# Patient Record
Sex: FEMALE | Race: WHITE | NOT HISPANIC OR LATINO | Employment: FULL TIME | URBAN - METROPOLITAN AREA
[De-identification: names, ages, dates, MRNs, and addresses within clinical notes are randomized per-mention and may not be internally consistent; named-entity substitution may affect disease eponyms.]

---

## 2017-02-23 ENCOUNTER — TELEPHONE (OUTPATIENT)
Dept: FAMILY MEDICINE CLINIC | Facility: CLINIC | Age: 62
End: 2017-02-23

## 2017-02-23 RX ORDER — ZOLPIDEM TARTRATE 10 MG/1
10 TABLET ORAL NIGHTLY
Qty: 30 TABLET | Refills: 2 | Status: SHIPPED | OUTPATIENT
Start: 2017-02-23 | End: 2017-04-28 | Stop reason: SDUPTHER

## 2017-02-23 NOTE — TELEPHONE ENCOUNTER
rx called in  On the written rx it was only for one refill the yamile states 2  I only called in 1 refill

## 2017-03-07 ENCOUNTER — TELEPHONE (OUTPATIENT)
Dept: FAMILY MEDICINE CLINIC | Facility: CLINIC | Age: 62
End: 2017-03-07

## 2017-03-07 NOTE — TELEPHONE ENCOUNTER
Pt called stating she is having problems with her hemorrhoids ans asked if she should see you or someone else

## 2017-04-19 RX ORDER — CITALOPRAM 40 MG/1
TABLET ORAL
Qty: 90 TABLET | Refills: 0 | Status: SHIPPED | OUTPATIENT
Start: 2017-04-19 | End: 2017-06-05 | Stop reason: SDUPTHER

## 2017-04-28 RX ORDER — ZOLPIDEM TARTRATE 10 MG/1
TABLET ORAL
Qty: 30 TABLET | Refills: 0 | Status: SHIPPED | OUTPATIENT
Start: 2017-04-28 | End: 2017-05-30 | Stop reason: SDUPTHER

## 2017-05-31 RX ORDER — ZOLPIDEM TARTRATE 10 MG/1
TABLET ORAL
Qty: 15 TABLET | Refills: 0 | Status: SHIPPED | OUTPATIENT
Start: 2017-05-31 | End: 2017-06-05 | Stop reason: SDUPTHER

## 2017-06-05 ENCOUNTER — OFFICE VISIT (OUTPATIENT)
Dept: FAMILY MEDICINE CLINIC | Facility: CLINIC | Age: 62
End: 2017-06-05

## 2017-06-05 VITALS
OXYGEN SATURATION: 97 % | DIASTOLIC BLOOD PRESSURE: 62 MMHG | SYSTOLIC BLOOD PRESSURE: 120 MMHG | TEMPERATURE: 97.9 F | WEIGHT: 129 LBS | BODY MASS INDEX: 21.49 KG/M2 | HEART RATE: 48 BPM | HEIGHT: 65 IN

## 2017-06-05 DIAGNOSIS — E78.2 MIXED HYPERLIPIDEMIA: Primary | ICD-10-CM

## 2017-06-05 DIAGNOSIS — F51.01 PRIMARY INSOMNIA: ICD-10-CM

## 2017-06-05 DIAGNOSIS — I10 ESSENTIAL HYPERTENSION: ICD-10-CM

## 2017-06-05 LAB
ALBUMIN SERPL-MCNC: 4.5 G/DL (ref 3.5–5.2)
ALBUMIN/GLOB SERPL: 1.6 G/DL
ALP SERPL-CCNC: 50 U/L (ref 39–117)
ALT SERPL-CCNC: 11 U/L (ref 1–33)
AST SERPL-CCNC: 20 U/L (ref 1–32)
BILIRUB SERPL-MCNC: 0.3 MG/DL (ref 0.1–1.2)
BUN SERPL-MCNC: 30 MG/DL (ref 8–23)
BUN/CREAT SERPL: 25.9 (ref 7–25)
CALCIUM SERPL-MCNC: 10.1 MG/DL (ref 8.6–10.5)
CHLORIDE SERPL-SCNC: 102 MMOL/L (ref 98–107)
CHOLEST SERPL-MCNC: 233 MG/DL (ref 0–200)
CO2 SERPL-SCNC: 28.7 MMOL/L (ref 22–29)
CREAT SERPL-MCNC: 1.16 MG/DL (ref 0.57–1)
GLOBULIN SER CALC-MCNC: 2.8 GM/DL
GLUCOSE SERPL-MCNC: 99 MG/DL (ref 65–99)
HDLC SERPL-MCNC: 77 MG/DL (ref 40–60)
LDLC SERPL CALC-MCNC: 141 MG/DL (ref 0–100)
LDLC/HDLC SERPL: 1.83 {RATIO}
POTASSIUM SERPL-SCNC: 4.6 MMOL/L (ref 3.5–5.2)
PROT SERPL-MCNC: 7.3 G/DL (ref 6–8.5)
SODIUM SERPL-SCNC: 141 MMOL/L (ref 136–145)
TRIGL SERPL-MCNC: 77 MG/DL (ref 0–150)
TSH SERPL DL<=0.005 MIU/L-ACNC: 4 MIU/ML (ref 0.27–4.2)
VLDLC SERPL CALC-MCNC: 15.4 MG/DL (ref 5–40)

## 2017-06-05 PROCEDURE — 99213 OFFICE O/P EST LOW 20 MIN: CPT | Performed by: FAMILY MEDICINE

## 2017-06-05 RX ORDER — ZOLPIDEM TARTRATE 10 MG/1
10 TABLET ORAL NIGHTLY PRN
Qty: 30 TABLET | Refills: 5 | Status: SHIPPED | OUTPATIENT
Start: 2017-06-05 | End: 2017-12-15 | Stop reason: SDUPTHER

## 2017-06-05 RX ORDER — LISINOPRIL 10 MG/1
10 TABLET ORAL DAILY
Qty: 90 TABLET | Refills: 1 | Status: SHIPPED | OUTPATIENT
Start: 2017-06-05 | End: 2018-05-14 | Stop reason: SDUPTHER

## 2017-06-05 RX ORDER — CITALOPRAM 40 MG/1
40 TABLET ORAL DAILY
Qty: 90 TABLET | Refills: 1 | Status: SHIPPED | OUTPATIENT
Start: 2017-06-05

## 2017-06-05 NOTE — PROGRESS NOTES
Subjective   Hanna [place] is a 62 y.o. female presenting with   Chief Complaint   Patient presents with   • Hypertension     check up    • Hyperlipidemia     check up    • Med Refill     zolpidem  and  lovastatin    • Labs Only     drug screen         HPI Comments: 62-year-old nonsmoker here for follow-up for high blood pressure and high cholesterol and insomnia and anxiety.  She tells me she is doing very well without any side effects from her medication.  She tells me she will be due for a repeat colonoscopy later this year.  She has a gynecologist and gets regular mammograms.    She works out 4 days a week and that explains her bradycardia.  She says she feels great and has not had any problems so far.    Hypertension     Hyperlipidemia          The following portions of the patient's history were reviewed and updated as appropriate: current medications, past family history, past medical history, past social history, past surgical history and problem list.    Review of Systems   All other systems reviewed and are negative.      Objective   Physical Exam   Constitutional: She is oriented to person, place, and time. She appears well-developed and well-nourished. No distress.   HENT:   Head: Normocephalic and atraumatic.   Eyes: EOM are normal. Pupils are equal, round, and reactive to light.   Neck: Normal range of motion. Neck supple.   Cardiovascular: Regular rhythm, normal heart sounds and intact distal pulses.  Bradycardia present.    No murmur heard.  Pulmonary/Chest: Effort normal and breath sounds normal. No respiratory distress. She has no wheezes.   Musculoskeletal: Normal range of motion. She exhibits no edema or tenderness.   Neurological: She is alert and oriented to person, place, and time.   Skin: Skin is warm and dry. She is not diaphoretic.   Psychiatric: She has a normal mood and affect. Her behavior is normal.   Nursing note and vitals reviewed.      Assessment/Plan   Hanna was seen  today for hypertension, hyperlipidemia, med refill and labs only.    Diagnoses and all orders for this visit:    Mixed hyperlipidemia  -     Comprehensive Metabolic Panel  -     Lipid Panel With LDL / HDL Ratio    Essential hypertension  -     Comprehensive Metabolic Panel  -     TSH    Primary insomnia  -     [id number] 9+Oxycodone+Crt-Unbund    Other orders  -     zolpidem (AMBIEN) 10 MG tablet; Take 1 tablet by mouth At Night As Needed for Sleep.  -     lisinopril (PRINIVIL,ZESTRIL) 10 MG tablet; Take 1 tablet by mouth Daily.  -     citalopram (CeleXA) 40 MG tablet; Take 1 tablet by mouth Daily.                   I would like him to return for another visit in 6 month(s)

## 2017-06-05 NOTE — PATIENT INSTRUCTIONS
This is a very nice 62-year-old who is here for follow-up.  I will renew her medications and call her when her blood test results are available.

## 2017-06-06 NOTE — PROGRESS NOTES
Please call the patient regarding her abnormal result.  lmovm informing pt of her results asked her to call if any questions

## 2017-06-07 RX ORDER — LOVASTATIN 20 MG/1
TABLET ORAL
Qty: 90 TABLET | Refills: 3 | Status: SHIPPED | OUTPATIENT
Start: 2017-06-07 | End: 2017-10-27 | Stop reason: SDUPTHER

## 2017-10-06 ENCOUNTER — OFFICE VISIT (OUTPATIENT)
Dept: SURGERY | Facility: CLINIC | Age: 62
End: 2017-10-06

## 2017-10-06 VITALS
SYSTOLIC BLOOD PRESSURE: 124 MMHG | TEMPERATURE: 98.3 F | BODY MASS INDEX: 21.16 KG/M2 | HEART RATE: 56 BPM | OXYGEN SATURATION: 99 % | DIASTOLIC BLOOD PRESSURE: 80 MMHG | HEIGHT: 65 IN | WEIGHT: 127 LBS

## 2017-10-06 DIAGNOSIS — K64.8 INTERNAL HEMORRHOIDS WITH COMPLICATION: Primary | ICD-10-CM

## 2017-10-06 DIAGNOSIS — K62.5 RECTAL BLEEDING: ICD-10-CM

## 2017-10-06 DIAGNOSIS — K64.4 ANAL SKIN TAG: ICD-10-CM

## 2017-10-06 PROCEDURE — 46600 DIAGNOSTIC ANOSCOPY SPX: CPT | Performed by: COLON & RECTAL SURGERY

## 2017-10-06 PROCEDURE — 99244 OFF/OP CNSLTJ NEW/EST MOD 40: CPT | Performed by: COLON & RECTAL SURGERY

## 2017-10-06 NOTE — PROGRESS NOTES
Hanna Valle is a 62 y.o. female who is seen as a consult at the request of Carlitos De La Vega MD for anal swelling and bleeding     HPI:    Pt c/o hemorrhoids bothering her    Bother her almost every day    Occasional bleeding about once per week    Has daily BM    C/o anal swelling after BMs    Wipes vigorously after BMs    Stools mushy    No fiber or stool softeners    Has tried PrepH cream  Has used x2-3 week for 6 months    Most recent colonoscopy 2008: normal per patient    FamHx: no known hx colon polyps or colon cancer    Past Medical History:   Diagnosis Date   • Allergic rhinitis    • Fracture of ankle     left   • Hyperlipidemia    • Hypertension    • Insomnia        Past Surgical History:   Procedure Laterality Date   • ANKLE SURGERY Left    • COLONOSCOPY  2008   • TONSILLECTOMY AND ADENOIDECTOMY         Social History:   reports that she has never smoked. She does not have any smokeless tobacco history on file. She reports that she drinks alcohol.      Marriage status:     Family History   Problem Relation Age of Onset   • Hypertension Mother      benign essential   • Stroke Mother    • Hypertension Father      benign essential   • Heart disease Father          Current Outpatient Prescriptions:   •  Cetirizine HCl (ZYRTEC ALLERGY) 10 MG capsule, Take by mouth., Disp: , Rfl:   •  citalopram (CeleXA) 40 MG tablet, Take 1 tablet by mouth Daily., Disp: 90 tablet, Rfl: 1  •  lisinopril (PRINIVIL,ZESTRIL) 10 MG tablet, Take 1 tablet by mouth Daily., Disp: 90 tablet, Rfl: 1  •  lovastatin (MEVACOR) 20 MG tablet, TAKE ONE TABLET BY MOUTH DAILY, Disp: 90 tablet, Rfl: 3  •  zolpidem (AMBIEN) 10 MG tablet, Take 1 tablet by mouth At Night As Needed for Sleep., Disp: 30 tablet, Rfl: 5    Allergy  Review of patient's allergies indicates no known allergies.    Review of Systems   Endocrine: Positive for cold intolerance.   Musculoskeletal: Positive for arthritis and joint pain. Negative for back pain and  joint swelling.   Neurological: Positive for excessive daytime sleepiness. Negative for brief paralysis, dizziness, focal weakness and light-headedness.   All other systems reviewed and are negative.      Vitals:    10/06/17 1350   BP: 124/80   Pulse: 56   Temp: 98.3 °F (36.8 °C)   SpO2: 99%     Body mass index is 20.97 kg/(m^2).    Physical Exam   Constitutional: She is oriented to person, place, and time. She appears well-developed and well-nourished. No distress.   HENT:   Head: Normocephalic and atraumatic.   Nose: Nose normal.   Mouth/Throat: Oropharynx is clear and moist.   Eyes: Conjunctivae and EOM are normal. Pupils are equal, round, and reactive to light.   Neck: Normal range of motion. No tracheal deviation present.   Pulmonary/Chest: Effort normal and breath sounds normal. No respiratory distress.   Abdominal: Soft. Bowel sounds are normal. She exhibits no distension.   Genitourinary:   Genitourinary Comments: Perianal exam: external hem - enlarged tags right anterior and right posterior  JULIA- good tone, no masses  Anoscopy performed:  Grade 2 x 3 internal hem: irritated   Musculoskeletal: Normal range of motion. She exhibits no edema or deformity.   Neurological: She is alert and oriented to person, place, and time. No cranial nerve deficit. Coordination and gait normal.   Skin: Skin is warm and dry.   Psychiatric: She has a normal mood and affect. Her behavior is normal. Judgment normal.       Review of Medical Record:  I reviewed Dr. De La Vega telephone note: pt c/o hemorrhoids    Assessment:  1. Internal hemorrhoids with complication    2. Rectal bleeding    3. Anal skin tag        Plan:    For the rectal bleeding, I recommend colonoscopy to rule out major sources of bleeding other than hemorrhoids. At the time of colonoscopy if there are no serious issues found at that time, I recommend doing rubber band ligation of the enlarged internal hemorrhoids.  I described risk, benefits and alternatives to the  patient.  I described to patient typical post procedure recovery, typical outcomes, and 85% success rate. RBL will not take care of the tags.  The patient wishes to proceed.    In the meantime, I recommended for patient to treat conservatively with MiraLAX and fiber.  I recommend fiber therapy and detailed and gave written instructions on how to achieve a high fiber diet.    I recommend patient discontinue PrepH cream, as long-term use can cause thinning of the tissue.      Scribed for Ruperto Banegas MD by Tiff Richmond PA-C 10/6/2017  This patient was evaluated by me, recommendations made, documentation reviewed, edited, and revised by me, Ruperto Banegas MD

## 2017-10-27 ENCOUNTER — TELEPHONE (OUTPATIENT)
Dept: FAMILY MEDICINE CLINIC | Facility: CLINIC | Age: 62
End: 2017-10-27

## 2017-10-27 RX ORDER — LOVASTATIN 40 MG/1
40 TABLET ORAL NIGHTLY
Qty: 90 TABLET | Refills: 1 | Status: SHIPPED | OUTPATIENT
Start: 2017-10-27 | End: 2018-04-22 | Stop reason: SDUPTHER

## 2017-10-27 NOTE — TELEPHONE ENCOUNTER
I notified pt of labs and she is ok with increasing the lovastatin. You can send it in to [facility] in [place] thanks

## 2017-11-02 ENCOUNTER — OFFICE VISIT (OUTPATIENT)
Dept: SPORTS MEDICINE | Facility: CLINIC | Age: 62
End: 2017-11-02

## 2017-11-02 VITALS
HEIGHT: 65 IN | BODY MASS INDEX: 21.49 KG/M2 | OXYGEN SATURATION: 99 % | HEART RATE: 56 BPM | DIASTOLIC BLOOD PRESSURE: 86 MMHG | RESPIRATION RATE: 16 BRPM | SYSTOLIC BLOOD PRESSURE: 140 MMHG | WEIGHT: 129 LBS

## 2017-11-02 DIAGNOSIS — M25.572 CHRONIC PAIN OF LEFT ANKLE: ICD-10-CM

## 2017-11-02 DIAGNOSIS — G89.29 CHRONIC PAIN OF LEFT ANKLE: ICD-10-CM

## 2017-11-02 DIAGNOSIS — M25.612 DECREASED RANGE OF MOTION OF LEFT SHOULDER: ICD-10-CM

## 2017-11-02 DIAGNOSIS — M19.172 POST-TRAUMATIC ARTHRITIS OF LEFT ANKLE: ICD-10-CM

## 2017-11-02 DIAGNOSIS — M25.511 ACUTE PAIN OF RIGHT SHOULDER: Primary | ICD-10-CM

## 2017-11-02 PROCEDURE — 99214 OFFICE O/P EST MOD 30 MIN: CPT | Performed by: FAMILY MEDICINE

## 2017-11-02 RX ORDER — PREDNISONE 50 MG/1
50 TABLET ORAL DAILY
Qty: 7 TABLET | Refills: 0 | Status: SHIPPED | OUTPATIENT
Start: 2017-11-02 | End: 2017-11-09
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
## 2017-11-10 ENCOUNTER — TREATMENT (OUTPATIENT)
Dept: PHYSICAL THERAPY | Facility: CLINIC | Age: 62
End: 2017-11-10

## 2017-11-10 DIAGNOSIS — M25.512 ACUTE PAIN OF LEFT SHOULDER: Primary | ICD-10-CM

## 2017-11-10 DIAGNOSIS — M75.02 ADHESIVE CAPSULITIS OF LEFT SHOULDER: ICD-10-CM

## 2017-11-10 PROCEDURE — 97140 MANUAL THERAPY 1/> REGIONS: CPT | Performed by: PHYSICAL THERAPIST

## 2017-11-10 PROCEDURE — 97110 THERAPEUTIC EXERCISES: CPT | Performed by: PHYSICAL THERAPIST

## 2017-11-10 PROCEDURE — 97161 PT EVAL LOW COMPLEX 20 MIN: CPT | Performed by: PHYSICAL THERAPIST
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
## 2017-11-13 ENCOUNTER — TREATMENT (OUTPATIENT)
Dept: PHYSICAL THERAPY | Facility: CLINIC | Age: 62
End: 2017-11-13

## 2017-11-13 DIAGNOSIS — M75.02 ADHESIVE CAPSULITIS OF LEFT SHOULDER: ICD-10-CM

## 2017-11-13 DIAGNOSIS — M25.512 ACUTE PAIN OF LEFT SHOULDER: Primary | ICD-10-CM

## 2017-11-13 PROCEDURE — 97110 THERAPEUTIC EXERCISES: CPT | Performed by: PHYSICAL THERAPIST

## 2017-11-13 PROCEDURE — 97140 MANUAL THERAPY 1/> REGIONS: CPT | Performed by: PHYSICAL THERAPIST

## 2017-11-13 NOTE — PROGRESS NOTES
Physical Therapy Daily Progress Note        Patient: Hanna Valle   : 1955  Diagnosis/ICD-10 Code:  Acute pain of left shoulder [M25.512]  Referring practitioner: Ramone Hedrick *  Date of Initial Visit: Type: THERAPY  Noted: 11/10/2017  Today's Date: 2017  Patient seen for 2 sessions         Hanna Valle reports:     Subjective   Patient reports she feels her shoulder had improved, still has pain and tightness however notes that she can move it a little better.  Objective   See Exercise, Manual, and Modality Logs for complete treatment.       Assessment/Plan  Patient tolerated treatment well with increased pain free ROM following manual interventions.  Progress per Plan of Care           Manual Therapy:    8     mins  04532;  Therapeutic Exercise:    15     mins  39788;     Neuromuscular Eliza:        mins  02682;    Therapeutic Activity:          mins  58160;     Gait Training:           mins  56280;     Ultrasound:          mins  86456;    Electrical Stimulation:         mins  59287 (MC );  Dry Needling          mins self-pay    Timed Treatment:   23   mins   Total Treatment:     45   mins    Britt Crow, PT  Physical Therapist

## 2017-11-14 ENCOUNTER — APPOINTMENT (OUTPATIENT)
Dept: WOMENS IMAGING | Facility: HOSPITAL | Age: 62
End: 2017-11-14

## 2017-11-14 PROCEDURE — 77067 SCR MAMMO BI INCL CAD: CPT | Performed by: RADIOLOGY

## 2017-11-17 ENCOUNTER — TREATMENT (OUTPATIENT)
Dept: PHYSICAL THERAPY | Facility: CLINIC | Age: 62
End: 2017-11-17

## 2017-11-17 DIAGNOSIS — M25.512 ACUTE PAIN OF LEFT SHOULDER: Primary | ICD-10-CM

## 2017-11-17 DIAGNOSIS — M75.02 ADHESIVE CAPSULITIS OF LEFT SHOULDER: ICD-10-CM

## 2017-11-17 PROCEDURE — 97140 MANUAL THERAPY 1/> REGIONS: CPT | Performed by: PHYSICAL THERAPIST

## 2017-11-17 PROCEDURE — 97110 THERAPEUTIC EXERCISES: CPT | Performed by: PHYSICAL THERAPIST

## 2017-11-17 NOTE — PROGRESS NOTES
Physical Therapy Daily Progress Note        Patient: Hanna Valle   : 1955  Diagnosis/ICD-10 Code:  Acute pain of left shoulder [M25.512]  Referring practitioner: Ramone Hedrick *  Date of Initial Visit: Type: THERAPY  Noted: 11/10/2017  Today's Date: 2017  Patient seen for 3 sessions         Hanna Valle reports:     Subjective   Patient reports she is having less pain in her shoulder and has been able to use her arm in motions that she had previously not been able to do.    Objective   See Exercise, Manual, and Modality Logs for complete treatment.       Assessment/Plan  Patient tolerated treatment well without complaints.  Improving ROM and GHJ mobility.  Continues with inferior and posterior glide restriction.  Progress per Plan of Care and Progress strengthening /stabilization /functional activity           Manual Therapy:    10     mins  26983;  Therapeutic Exercise:    15     mins  88241;     Neuromuscular Eliza:        mins  40592;    Therapeutic Activity:          mins  23868;     Gait Training:           mins  02725;     Ultrasound:          mins  96320;    Electrical Stimulation:         mins  77509 ( );  Dry Needling          mins self-pay    Timed Treatment:   25   mins   Total Treatment:     35   mins    Britt Crow, PT  Physical Therapist

## 2017-11-18 ENCOUNTER — HOSPITAL ENCOUNTER (OUTPATIENT)
Facility: HOSPITAL | Age: 62
Setting detail: HOSPITAL OUTPATIENT SURGERY
Discharge: HOME | End: 2017-11-18
Attending: COLON & RECTAL SURGERY | Admitting: COLON & RECTAL SURGERY

## 2017-11-18 ENCOUNTER — ANESTHESIA (OUTPATIENT)
Dept: GASTROENTEROLOGY | Facility: HOSPITAL | Age: 62
End: 2017-11-18

## 2017-11-18 ENCOUNTER — ANESTHESIA EVENT (OUTPATIENT)
Dept: GASTROENTEROLOGY | Facility: HOSPITAL | Age: 62
End: 2017-11-18

## 2017-11-18 VITALS
HEART RATE: 60 BPM | SYSTOLIC BLOOD PRESSURE: 130 MMHG | OXYGEN SATURATION: 98 % | TEMPERATURE: 98.2 F | WEIGHT: 123 LBS | RESPIRATION RATE: 16 BRPM | DIASTOLIC BLOOD PRESSURE: 84 MMHG | HEIGHT: 65 IN | BODY MASS INDEX: 20.49 KG/M2

## 2017-11-18 DIAGNOSIS — K62.5 RECTAL BLEEDING: ICD-10-CM

## 2017-11-18 PROCEDURE — 45398 COLONOSCOPY W/BAND LIGATION: CPT | Performed by: COLON & RECTAL SURGERY

## 2017-11-18 PROCEDURE — 25010000002 PROPOFOL 10 MG/ML EMULSION: Performed by: ANESTHESIOLOGY

## 2017-11-18 RX ORDER — PROPOFOL 10 MG/ML
VIAL (ML) INTRAVENOUS AS NEEDED
Status: DISCONTINUED | OUTPATIENT
Start: 2017-11-18 | End: 2017-11-18 | Stop reason: SURG

## 2017-11-18 RX ORDER — SODIUM CHLORIDE, SODIUM LACTATE, POTASSIUM CHLORIDE, CALCIUM CHLORIDE 600; 310; 30; 20 MG/100ML; MG/100ML; MG/100ML; MG/100ML
30 INJECTION, SOLUTION INTRAVENOUS CONTINUOUS PRN
Status: DISCONTINUED | OUTPATIENT
Start: 2017-11-18 | End: 2017-11-18 | Stop reason: HOSPADM

## 2017-11-18 RX ORDER — PROPOFOL 10 MG/ML
VIAL (ML) INTRAVENOUS CONTINUOUS PRN
Status: DISCONTINUED | OUTPATIENT
Start: 2017-11-18 | End: 2017-11-18

## 2017-11-18 RX ORDER — HYDROCODONE BITARTRATE AND ACETAMINOPHEN 5; 325 MG/1; MG/1
TABLET ORAL
Qty: 16 TABLET | Refills: 0 | Status: SHIPPED | OUTPATIENT
Start: 2017-11-18 | End: 2018-01-05

## 2017-11-18 RX ORDER — PROPOFOL 10 MG/ML
VIAL (ML) INTRAVENOUS CONTINUOUS PRN
Status: DISCONTINUED | OUTPATIENT
Start: 2017-11-18 | End: 2017-11-18 | Stop reason: SURG

## 2017-11-18 RX ORDER — LIDOCAINE HYDROCHLORIDE 20 MG/ML
INJECTION, SOLUTION INFILTRATION; PERINEURAL AS NEEDED
Status: DISCONTINUED | OUTPATIENT
Start: 2017-11-18 | End: 2017-11-18 | Stop reason: SURG

## 2017-11-18 RX ORDER — BUPIVACAINE HYDROCHLORIDE AND EPINEPHRINE 5; 5 MG/ML; UG/ML
INJECTION, SOLUTION PERINEURAL AS NEEDED
Status: DISCONTINUED | OUTPATIENT
Start: 2017-11-18 | End: 2017-11-18 | Stop reason: HOSPADM

## 2017-11-18 RX ADMIN — PROPOFOL 125 MCG/KG/MIN: 10 INJECTION, EMULSION INTRAVENOUS at 11:49

## 2017-11-18 RX ADMIN — PROPOFOL 75 MG: 10 INJECTION, EMULSION INTRAVENOUS at 11:49

## 2017-11-18 RX ADMIN — SODIUM CHLORIDE, POTASSIUM CHLORIDE, SODIUM LACTATE AND CALCIUM CHLORIDE 30 ML/HR: 600; 310; 30; 20 INJECTION, SOLUTION INTRAVENOUS at 11:44

## 2017-11-18 RX ADMIN — LIDOCAINE HYDROCHLORIDE 50 MG: 20 INJECTION, SOLUTION INFILTRATION; PERINEURAL at 11:49

## 2017-11-18 NOTE — PLAN OF CARE
Problem: Patient Care Overview (Adult)  Goal: Plan of Care Review  Outcome: Ongoing (interventions implemented as appropriate)    11/18/17 1126   Coping/Psychosocial Response Interventions   Plan Of Care Reviewed With patient   Patient Care Overview   Progress no change       Goal: Adult Individualization and Mutuality  Outcome: Ongoing (interventions implemented as appropriate)  Goal: Discharge Needs Assessment  Outcome: Ongoing (interventions implemented as appropriate)    11/18/17 1126   Discharge Needs Assessment   Concerns To Be Addressed no discharge needs identified         Problem: GI Endoscopy (Adult)  Goal: Signs and Symptoms of Listed Potential Problems Will be Absent or Manageable (GI Endoscopy)  Outcome: Ongoing (interventions implemented as appropriate)    11/18/17 1126   GI Endoscopy   Problems Assessed (GI Endoscopy) all   Problems Present (GI Endoscopy) none

## 2017-11-18 NOTE — H&P
Pt c/o hemorrhoids bothering her     Bother her almost every day     Occasional bleeding about once per week     Has daily BM     C/o anal swelling after BMs     Wipes vigorously after BMs     Stools mushy     No fiber or stool softeners     Has tried PrepH cream  Has used x2-3 week for 6 months     Most recent colonoscopy 2008: normal per patient     FamHx: no known hx colon polyps or colon cancer      Medical History         Past Medical History:   Diagnosis Date   • Allergic rhinitis     • Fracture of ankle       left   • Hyperlipidemia     • Hypertension     • Insomnia               Surgical History          Past Surgical History:   Procedure Laterality Date   • ANKLE SURGERY Left     • COLONOSCOPY   2008   • TONSILLECTOMY AND ADENOIDECTOMY                Social History:   reports that she has never smoked. She does not have any smokeless tobacco history on file. She reports that she drinks alcohol.        Marriage status:             Family History   Problem Relation Age of Onset   • Hypertension Mother         benign essential   • Stroke Mother     • Hypertension Father         benign essential   • Heart disease Father              Current Outpatient Prescriptions:   •  Cetirizine HCl (ZYRTEC ALLERGY) 10 MG capsule, Take by mouth., Disp: , Rfl:   •  citalopram (CeleXA) 40 MG tablet, Take 1 tablet by mouth Daily., Disp: 90 tablet, Rfl: 1  •  lisinopril (PRINIVIL,ZESTRIL) 10 MG tablet, Take 1 tablet by mouth Daily., Disp: 90 tablet, Rfl: 1  •  lovastatin (MEVACOR) 20 MG tablet, TAKE ONE TABLET BY MOUTH DAILY, Disp: 90 tablet, Rfl: 3  •  zolpidem (AMBIEN) 10 MG tablet, Take 1 tablet by mouth At Night As Needed for Sleep., Disp: 30 tablet, Rfl: 5     Allergy  Review of patient's allergies indicates no known allergies.     Review of Systems   Endocrine: Positive for cold intolerance.   Musculoskeletal: Positive for arthritis and joint pain. Negative for back pain and joint swelling.   Neurological: Positive  "for excessive daytime sleepiness. Negative for brief paralysis, dizziness, focal weakness and light-headedness.   All other systems reviewed and are negative.       Ht 64.5\" (163.8 cm)  Wt 123 lb (55.8 kg)  BMI 20.79 kg/m2    Physical Exam   Constitutional: She is oriented to person, place, and time. She appears well-developed and well-nourished. No distress.   HENT:   Head: Normocephalic and atraumatic.   Nose: Nose normal.   Mouth/Throat: Oropharynx is clear and moist.   Eyes: Conjunctivae and EOM are normal. Pupils are equal, round, and reactive to light.   Neck: Normal range of motion. No tracheal deviation present.   Pulmonary/Chest: Effort normal and breath sounds normal. No respiratory distress.   Abdominal: Soft. Bowel sounds are normal. She exhibits no distension.   Genitourinary:   Genitourinary Comments: Perianal exam: external hem - enlarged tags right anterior and right posterior  JULIA- good tone, no masses  Anoscopy performed:  Grade 2 x 3 internal hem: irritated   Musculoskeletal: Normal range of motion. She exhibits no edema or deformity.   Neurological: She is alert and oriented to person, place, and time. No cranial nerve deficit. Coordination and gait normal.   Skin: Skin is warm and dry.   Psychiatric: She has a normal mood and affect. Her behavior is normal. Judgment normal.         Review of Medical Record:  I reviewed Dr. De La Vega telephone note: pt c/o hemorrhoids     Assessment:  1. Internal hemorrhoids with complication    2. Rectal bleeding    3. Anal skin tag          Plan:     For the rectal bleeding, I recommend colonoscopy to rule out major sources of bleeding other than hemorrhoids. At the time of colonoscopy if there are no serious issues found at that time, I recommend doing rubber band ligation of the enlarged internal hemorrhoids.  I described risk, benefits and alternatives to the patient.  I described to patient typical post procedure recovery, typical outcomes, and 85% success " rate. RBL will not take care of the tags.  The patient wishes to proceed.     In the meantime, I recommended for patient to treat conservatively with MiraLAX and fiber.  I recommend fiber therapy and detailed and gave written instructions on how to achieve a high fiber diet.

## 2017-11-18 NOTE — ANESTHESIA POSTPROCEDURE EVALUATION
Patient: Hanna Valle    Procedure Summary     Date Anesthesia Start Anesthesia Stop Room / Location    11/18/17 1146 1213  [facility] /  [facility]       Procedure Diagnosis Surgeon Provider    COLONOSCOPY TO CECUM WITH HEMORRHOIDAL BANDING X 3 (N/A ) Rectal bleeding  (Rectal bleeding [K62.5]) MD Carlos Manuel Garcia MD          Anesthesia Type: MAC  Last vitals  BP   130/84 (11/18/17 1231)   Temp   36.8 °C (98.2 °F) (11/18/17 1212)   Pulse   60 (11/18/17 1231)   Resp   16 (11/18/17 1231)     SpO2   98 % (11/18/17 1231)     Post Anesthesia Care and Evaluation    Patient location during evaluation: bedside  Patient participation: complete - patient participated  Level of consciousness: awake  Pain score: 1  Pain management: adequate  Anesthetic complications: No anesthetic complications    Cardiovascular status: acceptable  Respiratory status: acceptable  Hydration status: acceptable

## 2017-11-18 NOTE — DISCHARGE INSTRUCTIONS
DIET:    We recommend a high fiber diet to keep your stool soft and to prevent constipation.  Eat plenty of fruits and vegetables.  Drink 8-10 glasses of water or juice every day.  Eat whole grain cereals and breads.  Salads with raw vegetables are also a good source of fiber.    STOOL :    Metamucil, Citrucil, Konsyl, Fibercon, Benefiber or Miralax.  Take ____ tablespoon(s)/teaspoon(s) in a glass of water or juice _____ time(s) per day.      PAIN CONTROL:    Sit in a warm tub of water to relieve minor discomfort.  Use Tylenol or other non-aspirin medication.  Do not take aspirin for 10 days following procedure unless ordered by your physician.  Avoid the use of narcotics, such as codeine, because they may cause constipation.    COMPLICATIONS:    1. A small amount of bright red bleeding can be expected.  If bleeding persists or if it is     greater than 1 cup full of blood, call your doctor.    2.  If you have severe pain, fever (greater that 101) or if you are unable to urinate within 6 hours following hemorrhoidal treatment, call your doctor.

## 2017-11-29 ENCOUNTER — TREATMENT (OUTPATIENT)
Dept: PHYSICAL THERAPY | Facility: CLINIC | Age: 62
End: 2017-11-29

## 2017-11-29 DIAGNOSIS — M25.512 ACUTE PAIN OF LEFT SHOULDER: Primary | ICD-10-CM

## 2017-11-29 DIAGNOSIS — M75.02 ADHESIVE CAPSULITIS OF LEFT SHOULDER: ICD-10-CM

## 2017-11-29 PROCEDURE — 97140 MANUAL THERAPY 1/> REGIONS: CPT | Performed by: PHYSICAL THERAPIST

## 2017-11-29 PROCEDURE — 97110 THERAPEUTIC EXERCISES: CPT | Performed by: PHYSICAL THERAPIST

## 2017-11-29 NOTE — PROGRESS NOTES
Physical Therapy Daily Progress Note        Patient: Hanna Valle   : 1955  Diagnosis/ICD-10 Code:  Acute pain of left shoulder [M25.512]  Referring practitioner: Ramone Hedrick *  Date of Initial Visit: Type: THERAPY  Noted: 11/10/2017  Today's Date: 2017  Patient seen for 4 sessions         Hanna Valle reports:       Subjective   Patient reports her shoulder is much better, not having pain and had a lot more mobility.    Objective   See Exercise, Manual, and Modality Logs for complete treatment.       Assessment/Plan  Patient tolerated treatment and exercise progression well without complaints.  Improving ROM and GHJ capsular mobility.  Progress per Plan of Care and Progress strengthening /stabilization /functional activity           Manual Therapy:    10     mins  57219;  Therapeutic Exercise:    20     mins  51480;     Neuromuscular Eliza:        mins  57081;    Therapeutic Activity:          mins  38367;     Gait Training:           mins  31344;     Ultrasound:          mins  27093;    Electrical Stimulation:         mins  78822 (MC );  Dry Needling          mins self-pay    Timed Treatment:   30   mins   Total Treatment:     35   mins    Britt Crow, PT  Physical Therapist

## 2017-12-06 ENCOUNTER — TREATMENT (OUTPATIENT)
Dept: PHYSICAL THERAPY | Facility: CLINIC | Age: 62
End: 2017-12-06

## 2017-12-06 DIAGNOSIS — M75.02 ADHESIVE CAPSULITIS OF LEFT SHOULDER: ICD-10-CM

## 2017-12-06 DIAGNOSIS — M25.512 ACUTE PAIN OF LEFT SHOULDER: Primary | ICD-10-CM

## 2017-12-06 PROCEDURE — 97110 THERAPEUTIC EXERCISES: CPT | Performed by: PHYSICAL THERAPIST

## 2017-12-06 PROCEDURE — 97140 MANUAL THERAPY 1/> REGIONS: CPT | Performed by: PHYSICAL THERAPIST

## 2017-12-06 NOTE — PROGRESS NOTES
Physical Therapy Discharge Summary        Patient: Hanna Valle   : 1955  Diagnosis/ICD-10 Code:  Acute pain of left shoulder [M25.512]  Referring practitioner: Ramone Hedrick *  Date of Initial Visit: Type: THERAPY  Noted: 11/10/2017  Today's Date: 2017  Patient seen for 5 sessions         Hanna Valle reports:     Subjective   Patient reports she has not been having pain in her shoulder with daily activities and at night might have some mild soreness if she sleeps on that side.  Reports she has returned to all activities without pain.  Has a slight tightness and limited overhead reach with one exercise-back bend over ball with overhead arm reach.    Quick DASH score: 0    Objective   GHJ mobility WFL-mild limitation in posterior/inferior glide  Shoulder ROM WFL  Shoulder strength WNL  Scapular strength WFL,     Treatments consisted of Therapeutic exercise, Manual therapy, heat, ice.    See Exercise, Manual, and Modality Logs for complete treatment.   Modified exercise technique with prone Y,T,W exercises to ensure no impingement of shoulder during the exercise.       Assessment/Plan     Plan Goals: Met/Partially Met  Short Term (1-2 wks):  1. Patient to have increased right shoulder ROM: WNL's to restore functional joint mobility and achieve symmetry with uninvolved side-Partially Met  2. Patient will have increased right shoulder strength to 5/5 to allow for increased joint stability and to decrease joint/soft tissue stresses.-Met  3.  Patient will have increased GHJ mobility to WNLs to allow for restoration of normal joint mechanics and decrease joint/soft tissue stresses.-Partiallt Met  4.  Patient will have improved DASH score to 10% or less.-Met    Long Term Goal (3-4):  1.  Patient will reports decreased shoulder pain to 0/10 to allow for restorative sleep and return to PLOF/Work/Sport/Leisure activities.-Met  2.  Patient will be independent in performance of HEP  for carryover upon discharge from skilled PT services.-Met      Patient to be discharged from PT at this time.  Patient to continue with HEP and self directed exercise program.       Manual Therapy:    10     mins  60020;  Therapeutic Exercise:    20     mins  04810;     Neuromuscular Eliza:        mins  13667;    Therapeutic Activity:          mins  01164;     Gait Training:           mins  80732;     Ultrasound:          mins  94158;    Electrical Stimulation:         mins  02145 ( );  Dry Needling          mins self-pay    Timed Treatment:   30   mins   Total Treatment:     30   mins    Britt Crow, PT  Physical Therapist

## 2017-12-15 RX ORDER — ZOLPIDEM TARTRATE 10 MG/1
TABLET ORAL
Qty: 30 TABLET | Refills: 0 | OUTPATIENT
Start: 2017-12-15 | End: 2017-12-18 | Stop reason: SDUPTHER

## 2017-12-18 ENCOUNTER — TELEPHONE (OUTPATIENT)
Dept: FAMILY MEDICINE CLINIC | Facility: CLINIC | Age: 62
End: 2017-12-18

## 2017-12-18 RX ORDER — ZOLPIDEM TARTRATE 10 MG/1
10 TABLET ORAL NIGHTLY PRN
Qty: 30 TABLET | Refills: 0 | OUTPATIENT
Start: 2017-12-18 | End: 2018-01-05 | Stop reason: SDUPTHER

## 2018-01-05 ENCOUNTER — OFFICE VISIT (OUTPATIENT)
Dept: FAMILY MEDICINE CLINIC | Facility: CLINIC | Age: 63
End: 2018-01-05

## 2018-01-05 VITALS
HEART RATE: 76 BPM | OXYGEN SATURATION: 97 % | SYSTOLIC BLOOD PRESSURE: 102 MMHG | BODY MASS INDEX: 21.66 KG/M2 | TEMPERATURE: 97.6 F | HEIGHT: 65 IN | WEIGHT: 130 LBS | DIASTOLIC BLOOD PRESSURE: 60 MMHG

## 2018-01-05 DIAGNOSIS — F51.01 PRIMARY INSOMNIA: Primary | ICD-10-CM

## 2018-01-05 DIAGNOSIS — E78.2 MIXED HYPERLIPIDEMIA: ICD-10-CM

## 2018-01-05 DIAGNOSIS — I10 ESSENTIAL HYPERTENSION: ICD-10-CM

## 2018-01-05 PROBLEM — K62.5 RECTAL BLEEDING: Status: RESOLVED | Noted: 2017-10-06 | Resolved: 2018-01-05

## 2018-01-05 PROCEDURE — 99213 OFFICE O/P EST LOW 20 MIN: CPT | Performed by: FAMILY MEDICINE

## 2018-01-05 RX ORDER — ZOLPIDEM TARTRATE 10 MG/1
10 TABLET ORAL NIGHTLY PRN
Qty: 30 TABLET | Refills: 5 | Status: SHIPPED | OUTPATIENT
Start: 2018-01-05 | End: 2018-07-16 | Stop reason: SDUPTHER

## 2018-01-05 NOTE — PROGRESS NOTES
Subjective   Hanna Valle is a 62 y.o. female presenting with   Chief Complaint   Patient presents with   • Insomnia        HPI Comments: 62-year-old white female nonsmoker here for routine follow-up for high blood pressure and high cholesterol and insomnia.  She tells me she would like to start trying to get off of the cholesterol medication.  I told her that we would need to first get a baseline and then see just how bad it gets when she quits her medication.  She has already been today so we will have to do this someday when she can come in fasting.    She says that the Ambien works well for her and she does not use it every night and she does needs a refill on that.    Insomnia          The following portions of the patient's history were reviewed and updated as appropriate: current medications, past family history, past medical history, past social history, past surgical history and problem list.    Review of Systems   Psychiatric/Behavioral: The patient has insomnia.    All other systems reviewed and are negative.      Objective   Physical Exam   Constitutional: She is oriented to person, place, and time. She appears well-developed and well-nourished. No distress.   HENT:   Head: Normocephalic and atraumatic.   Eyes: EOM are normal. Pupils are equal, round, and reactive to light.   Neck: Normal range of motion. Neck supple.   Cardiovascular: Normal rate and regular rhythm.    Pulmonary/Chest: Effort normal and breath sounds normal.   Musculoskeletal: Normal range of motion. She exhibits no edema or tenderness.   Neurological: She is alert and oriented to person, place, and time.   Skin: Skin is warm and dry. She is not diaphoretic.   Psychiatric: She has a normal mood and affect. Her behavior is normal.   Nursing note and vitals reviewed.      Assessment/Plan   Hanna MCCRARY was seen today for insomnia.    Diagnoses and all orders for this visit:    Primary insomnia    Mixed hyperlipidemia  -      Comprehensive Metabolic Panel  -     Lipid Panel With LDL / HDL Ratio  -     TSH    Essential hypertension  -     Comprehensive Metabolic Panel  -     Lipid Panel With LDL / HDL Ratio  -     TSH    Other orders  -     zolpidem (AMBIEN) 10 MG tablet; Take 1 tablet by mouth At Night As Needed for Sleep.                   I would like him to return for another visit in 6 month(s)

## 2018-01-29 ENCOUNTER — OFFICE VISIT (OUTPATIENT)
Dept: FAMILY MEDICINE CLINIC | Facility: CLINIC | Age: 63
End: 2018-01-29

## 2018-01-29 VITALS
HEART RATE: 68 BPM | SYSTOLIC BLOOD PRESSURE: 130 MMHG | TEMPERATURE: 97.9 F | WEIGHT: 130 LBS | RESPIRATION RATE: 16 BRPM | BODY MASS INDEX: 21.66 KG/M2 | HEIGHT: 65 IN | OXYGEN SATURATION: 97 % | DIASTOLIC BLOOD PRESSURE: 80 MMHG

## 2018-01-29 DIAGNOSIS — M25.512 ACUTE PAIN OF LEFT SHOULDER: ICD-10-CM

## 2018-01-29 DIAGNOSIS — R94.31 ABNORMAL ECG: Primary | ICD-10-CM

## 2018-01-29 DIAGNOSIS — E78.2 MIXED HYPERLIPIDEMIA: Primary | ICD-10-CM

## 2018-01-29 DIAGNOSIS — I10 ESSENTIAL HYPERTENSION: ICD-10-CM

## 2018-01-29 PROCEDURE — 93000 ELECTROCARDIOGRAM COMPLETE: CPT | Performed by: FAMILY MEDICINE

## 2018-01-29 PROCEDURE — 99213 OFFICE O/P EST LOW 20 MIN: CPT | Performed by: FAMILY MEDICINE

## 2018-01-29 NOTE — PROGRESS NOTES
Subjective   Hanna Valle is a 63 y.o. female presenting with   Chief Complaint   Patient presents with   • Anxiety        HPI Comments: This is a 63-year-old  white female who does 30 minutes of elliptical exercise and stretching exercises for an additional 30 minutes 5 days a week.  During that time she has no chest pain or shortness of breath or dizziness or palpitations.  However her  just had a treadmill study that was abnormal and was admitted and had a quadruple bypass.  Since he had no symptoms she is quite concerned that perhaps she has some heart disease.  She tells me that she has no family history of early onset heart disease but she does have a history of high blood pressure and high cholesterol.  She has become rather anxious about this like to have some sort of cardiac workup.    She also has a history of frozen left shoulder and has been through physical therapy and now does exercises at home to maintain range of motion.  Because she has some aching in the left shoulder she is concerned that this is angina.  However it never hurts when she is on her treadmill and only really is a little sore when she is doing her stretching exercises.    The ECG does show some nonspecific T-wave changes inferiorly laterally.  I suggested a treadmill stress test but she says she strongly feels this is just anxiety over her 's condition, and she will think about it and call me if she wants to proceed.    Anxiety   Symptoms include nervous/anxious behavior.            The following portions of the patient's history were reviewed and updated as appropriate: current medications, past family history, past medical history, past social history, past surgical history and problem list.    Review of Systems   Musculoskeletal: Positive for arthralgias.   Psychiatric/Behavioral: The patient is nervous/anxious.    All other systems reviewed and are negative.      Objective   Physical Exam    Constitutional: She appears well-developed and well-nourished. No distress.   HENT:   Head: Normocephalic and atraumatic.   Eyes: EOM are normal. Pupils are equal, round, and reactive to light.   Neck: Normal range of motion. Neck supple. No thyromegaly present.   Cardiovascular: Normal rate, regular rhythm, normal heart sounds and intact distal pulses.  Exam reveals no friction rub.    No murmur heard.  Pulmonary/Chest: Effort normal and breath sounds normal. No respiratory distress. She has no wheezes.   Musculoskeletal: Normal range of motion. She exhibits tenderness (modest tenderness to full range of motion of the left shoulder). She exhibits no edema.   Lymphadenopathy:     She has no cervical adenopathy.   Neurological: She is alert.   Skin: Skin is warm and dry. She is not diaphoretic.   Psychiatric: She has a normal mood and affect. Her behavior is normal.   Nursing note and vitals reviewed.      Assessment/Plan   Hanna MCCRARY was seen today for anxiety.    Diagnoses and all orders for this visit:    Mixed hyperlipidemia  -     ECG 12 Lead    Essential hypertension  -     ECG 12 Lead    Acute pain of left shoulder  -     ECG 12 Lead                   I would like him to return for another visit in 6 month(s)

## 2018-01-29 NOTE — PROGRESS NOTES
Procedure   Procedures     Adult ECG Report     Name: Hanna Valle   Age: 63 y.o.   Gender: female       Rate: 58   Rhythm: sinus bradycardia   QRS Axis: 72   [place] Interval: 159   QRS Duration: 88   QTc:    Voltages:    Conduction Disturbances: none   Other Abnormalities: slight ST depression inferiorly     Narrative Interpretation: This EKG was done to further evaluate her complaint of shoulder pain and a history of high blood pressure and high cholesterol.  It is borderline, in that there is some T-wave changes inferiorly laterally.  There are no old ones to compare this to so I suggested a stress test just to be thorough.  The patient declines that at this time.

## 2018-01-29 NOTE — PATIENT INSTRUCTIONS
This is a very nice 63-year-old who is here for follow-up for her blood pressure and cholesterol and also has some left shoulder discomfort that is improving with exercise.  I will notify her when the results of her blood work come back.  I would like her to call if there is any problem.

## 2018-01-30 LAB
ALBUMIN SERPL-MCNC: 4.3 G/DL (ref 3.5–5.2)
ALBUMIN/GLOB SERPL: 1.8 G/DL
ALP SERPL-CCNC: 49 U/L (ref 39–117)
ALT SERPL-CCNC: 13 U/L (ref 1–33)
AST SERPL-CCNC: 19 U/L (ref 1–32)
BILIRUB SERPL-MCNC: 0.3 MG/DL (ref 0.1–1.2)
BUN SERPL-MCNC: 27 MG/DL (ref 8–23)
BUN/CREAT SERPL: 22.7 (ref 7–25)
CALCIUM SERPL-MCNC: 9.2 MG/DL (ref 8.6–10.5)
CHLORIDE SERPL-SCNC: 105 MMOL/L (ref 98–107)
CHOLEST SERPL-MCNC: 200 MG/DL (ref 0–200)
CO2 SERPL-SCNC: 32.3 MMOL/L (ref 22–29)
CREAT SERPL-MCNC: 1.19 MG/DL (ref 0.57–1)
GFR SERPLBLD CREATININE-BSD FMLA CKD-EPI: 46 ML/MIN/1.73
GFR SERPLBLD CREATININE-BSD FMLA CKD-EPI: 55 ML/MIN/1.73
GLOBULIN SER CALC-MCNC: 2.4 GM/DL
GLUCOSE SERPL-MCNC: 85 MG/DL (ref 65–99)
HDLC SERPL-MCNC: 75 MG/DL (ref 40–60)
LDLC SERPL CALC-MCNC: 112 MG/DL (ref 0–100)
LDLC/HDLC SERPL: 1.5 {RATIO}
POTASSIUM SERPL-SCNC: 4.4 MMOL/L (ref 3.5–5.2)
PROT SERPL-MCNC: 6.7 G/DL (ref 6–8.5)
SODIUM SERPL-SCNC: 143 MMOL/L (ref 136–145)
TRIGL SERPL-MCNC: 64 MG/DL (ref 0–150)
TSH SERPL DL<=0.005 MIU/L-ACNC: 3.45 MIU/ML (ref 0.27–4.2)
VLDLC SERPL CALC-MCNC: 12.8 MG/DL (ref 5–40)

## 2018-03-02 ENCOUNTER — HOSPITAL ENCOUNTER (OUTPATIENT)
Dept: CARDIOLOGY | Facility: HOSPITAL | Age: 63
Discharge: HOME | End: 2018-03-02
Admitting: FAMILY MEDICINE

## 2018-03-02 DIAGNOSIS — R94.31 ABNORMAL ECG: ICD-10-CM

## 2018-03-02 LAB
BH CV STRESS BP STAGE 1: NORMAL
BH CV STRESS BP STAGE 2: NORMAL
BH CV STRESS BP STAGE 3: NORMAL
BH CV STRESS BP STAGE 4: NORMAL
BH CV STRESS DURATION MIN STAGE 1: 3
BH CV STRESS DURATION MIN STAGE 2: 3
BH CV STRESS DURATION MIN STAGE 3: 3
BH CV STRESS DURATION MIN STAGE 4: 2
BH CV STRESS DURATION SEC STAGE 1: 0
BH CV STRESS DURATION SEC STAGE 2: 0
BH CV STRESS DURATION SEC STAGE 3: 0
BH CV STRESS DURATION SEC STAGE 4: 0
BH CV STRESS GRADE STAGE 1: 10
BH CV STRESS GRADE STAGE 2: 12
BH CV STRESS GRADE STAGE 3: 14
BH CV STRESS GRADE STAGE 4: 16
BH CV STRESS HR STAGE 1: 87
BH CV STRESS HR STAGE 2: 104
BH CV STRESS HR STAGE 3: 122
BH CV STRESS HR STAGE 4: 137
BH CV STRESS METS STAGE 1: 5
BH CV STRESS METS STAGE 2: 7.5
BH CV STRESS METS STAGE 3: 10
BH CV STRESS METS STAGE 4: 13.5
BH CV STRESS PROTOCOL 1: NORMAL
BH CV STRESS RECOVERY BP: NORMAL MMHG
BH CV STRESS RECOVERY HR: 71 BPM
BH CV STRESS SPEED STAGE 1: 1.7
BH CV STRESS SPEED STAGE 2: 2.5
BH CV STRESS SPEED STAGE 3: 3.4
BH CV STRESS SPEED STAGE 4: 4.2
BH CV STRESS STAGE 1: 1
BH CV STRESS STAGE 2: 2
BH CV STRESS STAGE 3: 3
BH CV STRESS STAGE 4: 4
MAXIMAL PREDICTED HEART RATE: 155 BPM
PERCENT MAX PREDICTED HR: 88.39 %
STRESS BASELINE BP: NORMAL MMHG
STRESS BASELINE HR: 61 BPM
STRESS PERCENT HR: 104 %
STRESS POST ESTIMATED WORKLOAD: 12 METS
STRESS POST EXERCISE DUR MIN: 11 MIN
STRESS POST EXERCISE DUR SEC: 0 SEC
STRESS POST PEAK BP: NORMAL MMHG
STRESS POST PEAK HR: 137 BPM
STRESS TARGET HR: 132 BPM

## 2018-03-02 PROCEDURE — 93016 CV STRESS TEST SUPVJ ONLY: CPT | Performed by: INTERNAL MEDICINE

## 2018-03-02 PROCEDURE — 93017 CV STRESS TEST TRACING ONLY: CPT

## 2018-03-02 PROCEDURE — 93018 CV STRESS TEST I&R ONLY: CPT | Performed by: INTERNAL MEDICINE

## 2018-04-23 RX ORDER — LOVASTATIN 40 MG/1
TABLET ORAL
Qty: 90 TABLET | Refills: 0 | Status: SHIPPED | OUTPATIENT
Start: 2018-04-23 | End: 2018-07-19 | Stop reason: SDUPTHER

## 2018-05-14 RX ORDER — LISINOPRIL 10 MG/1
TABLET ORAL
Qty: 90 TABLET | Refills: 0 | Status: SHIPPED | OUTPATIENT
Start: 2018-05-14 | End: 2018-08-11 | Stop reason: SDUPTHER

## 2018-07-16 RX ORDER — ZOLPIDEM TARTRATE 10 MG/1
10 TABLET ORAL NIGHTLY PRN
Qty: 30 TABLET | Refills: 0 | OUTPATIENT
Start: 2018-07-16 | End: 2018-08-01 | Stop reason: SDUPTHER

## 2018-07-19 RX ORDER — LOVASTATIN 40 MG/1
TABLET ORAL
Qty: 90 TABLET | Refills: 1 | Status: SHIPPED | OUTPATIENT
Start: 2018-07-19 | End: 2019-04-17 | Stop reason: SDUPTHER

## 2018-08-01 ENCOUNTER — OFFICE VISIT (OUTPATIENT)
Dept: FAMILY MEDICINE CLINIC | Facility: CLINIC | Age: 63
End: 2018-08-01

## 2018-08-01 VITALS
OXYGEN SATURATION: 98 % | HEIGHT: 65 IN | DIASTOLIC BLOOD PRESSURE: 70 MMHG | BODY MASS INDEX: 21.46 KG/M2 | HEART RATE: 52 BPM | TEMPERATURE: 98.1 F | SYSTOLIC BLOOD PRESSURE: 110 MMHG | WEIGHT: 128.8 LBS

## 2018-08-01 DIAGNOSIS — F51.01 PRIMARY INSOMNIA: ICD-10-CM

## 2018-08-01 DIAGNOSIS — I10 ESSENTIAL HYPERTENSION: ICD-10-CM

## 2018-08-01 DIAGNOSIS — E78.2 MIXED HYPERLIPIDEMIA: Primary | ICD-10-CM

## 2018-08-01 PROCEDURE — 99213 OFFICE O/P EST LOW 20 MIN: CPT | Performed by: FAMILY MEDICINE

## 2018-08-01 RX ORDER — ZOLPIDEM TARTRATE 10 MG/1
10 TABLET ORAL NIGHTLY PRN
Qty: 30 TABLET | Refills: 5 | Status: SHIPPED | OUTPATIENT
Start: 2018-08-01 | End: 2019-02-06 | Stop reason: SDUPTHER

## 2018-08-01 NOTE — PATIENT INSTRUCTIONS
This is a very nice 63-year-old who is here for follow-up.  She is doing very well so I've given her a prescription.  I would like her to call if there is a problem.

## 2018-08-01 NOTE — PROGRESS NOTES
Subjective   Hanna Valle is a 63 y.o. female presenting with   Chief Complaint   Patient presents with   • Med Refill     ambien        This is a 63-year-old  white female nonsmoker who is here for follow-up for her blood pressure and also for her insomnia.  She says the Ambien continues to work very well and she does not have any side effects.  Specifically I asked her about morning sedation and problems with short-term memory and she said she is suffering with neither of those.  At this time she does not want to try any other medication.    Her blood pressure is very well controlled on the 10 mg lisinopril and she does not have any symptoms of orthostatic hypotension.         The following portions of the patient's history were reviewed and updated as appropriate: current medications, past family history, past medical history, past social history, past surgical history and problem list.    Review of Systems   All other systems reviewed and are negative.      Objective   Physical Exam   Constitutional: She is oriented to person, place, and time. She appears well-developed and well-nourished. No distress.   HENT:   Head: Normocephalic and atraumatic.   Eyes: Pupils are equal, round, and reactive to light. EOM are normal.   Neck: Normal range of motion. Neck supple. No JVD present. No thyromegaly present.   Cardiovascular: Normal rate, regular rhythm, normal heart sounds and intact distal pulses.  Exam reveals no friction rub.    No murmur heard.  Pulmonary/Chest: Effort normal and breath sounds normal.   Musculoskeletal: Normal range of motion.   Neurological: She is alert and oriented to person, place, and time.   Skin: Skin is warm and dry.   Psychiatric: She has a normal mood and affect. Her behavior is normal.   Nursing note and vitals reviewed.      Assessment/Plan   Hanna MCCRARY was seen today for med refill.    Diagnoses and all orders for this visit:    Mixed hyperlipidemia    Essential  hypertension    Primary insomnia    Other orders  -     zolpidem (AMBIEN) 10 MG tablet; Take 1 tablet by mouth At Night As Needed for Sleep.                   I would like him to return for another visit in 6 month(s)

## 2018-08-06 ENCOUNTER — TELEPHONE (OUTPATIENT)
Dept: FAMILY MEDICINE CLINIC | Facility: CLINIC | Age: 63
End: 2018-08-06

## 2018-08-06 NOTE — TELEPHONE ENCOUNTER
anju called and pt wants to get her ambien refilled on Thursday because she is going out of town.    It will be 7 days early    Last seen by matt 8/1/18 he gave her the script then.

## 2018-08-13 RX ORDER — LISINOPRIL 10 MG/1
TABLET ORAL
Qty: 90 TABLET | Refills: 3 | Status: SHIPPED | OUTPATIENT
Start: 2018-08-13

## 2018-08-20 ENCOUNTER — TELEPHONE (OUTPATIENT)
Dept: FAMILY MEDICINE CLINIC | Facility: CLINIC | Age: 63
End: 2018-08-20

## 2018-08-20 DIAGNOSIS — M54.5 CHRONIC LOW BACK PAIN, UNSPECIFIED BACK PAIN LATERALITY, WITH SCIATICA PRESENCE UNSPECIFIED: Primary | ICD-10-CM

## 2018-08-20 DIAGNOSIS — G89.29 CHRONIC LOW BACK PAIN, UNSPECIFIED BACK PAIN LATERALITY, WITH SCIATICA PRESENCE UNSPECIFIED: Primary | ICD-10-CM

## 2018-08-27 ENCOUNTER — TELEPHONE (OUTPATIENT)
Dept: FAMILY MEDICINE CLINIC | Facility: CLINIC | Age: 63
End: 2018-08-27

## 2018-08-27 DIAGNOSIS — G89.29 CHRONIC MIDLINE LOW BACK PAIN WITHOUT SCIATICA: Primary | ICD-10-CM

## 2018-08-27 DIAGNOSIS — M54.50 CHRONIC MIDLINE LOW BACK PAIN WITHOUT SCIATICA: Primary | ICD-10-CM

## 2018-08-28 ENCOUNTER — OFFICE VISIT (OUTPATIENT)
Dept: SPORTS MEDICINE | Facility: CLINIC | Age: 63
End: 2018-08-28

## 2018-08-28 VITALS
SYSTOLIC BLOOD PRESSURE: 124 MMHG | BODY MASS INDEX: 21.29 KG/M2 | HEIGHT: 65 IN | DIASTOLIC BLOOD PRESSURE: 76 MMHG | WEIGHT: 127.8 LBS

## 2018-08-28 DIAGNOSIS — M54.42 CHRONIC LEFT-SIDED LOW BACK PAIN WITH LEFT-SIDED SCIATICA: Primary | ICD-10-CM

## 2018-08-28 DIAGNOSIS — G89.29 CHRONIC LEFT-SIDED LOW BACK PAIN WITH LEFT-SIDED SCIATICA: Primary | ICD-10-CM

## 2018-08-28 PROCEDURE — 72100 X-RAY EXAM L-S SPINE 2/3 VWS: CPT | Performed by: FAMILY MEDICINE

## 2018-08-28 PROCEDURE — 99214 OFFICE O/P EST MOD 30 MIN: CPT | Performed by: FAMILY MEDICINE

## 2018-08-28 RX ORDER — PREDNISONE 20 MG/1
TABLET ORAL
Qty: 20 TABLET | Refills: 0 | Status: SHIPPED | OUTPATIENT
Start: 2018-08-28

## 2018-08-28 NOTE — PROGRESS NOTES
"Hanna MCCRARY is a 63 y.o. year old female    Chief Complaint   Patient presents with   • Lumbar Spine - Pain, Edema     Lt side radiating pain down leg, swelling toes, twitching        History of Present Illness  HPI     Sciatica: acute onset 4 wks ago. H/o same in her 30s which required bed rest, hospitalization. Fell off horse when 15 yo. Pain has been shooting down L leg in to foot. No b/b incontinence. No foot drop. Exercises 5 d a wk. Has back exercises she has continued to do since her 30s.    I have reviewed the patient's medical, family, and social history in detail and updated the computerized patient record.    Review of Systems   Constitutional: Negative for fever.   Musculoskeletal:        Per HPI   Skin: Negative for rash.   Neurological: Negative for weakness and numbness.   Psychiatric/Behavioral: Negative for sleep disturbance.   All other systems reviewed and are negative.      /76   Ht 163.8 cm (64.5\")   Wt 58 kg (127 lb 12.8 oz)   BMI 21.60 kg/m²      Physical Exam    Vital signs reviewed.   General: No acute distress.  Eyes: conjunctiva clear; pupils equally round and reactive  ENT: external ears and nose atraumatic; oropharynx clear  CV: no peripheral edema, 2+ distal pulses  Resp: normal respiratory effort, no use of accessory muscles  Skin: no rashes or wounds; normal turgor  Psych: mood and affect appropriate; recent and remote memory intact  Neuro: sensation to light touch intact    MSK Exam:  Back Exam     Range of Motion   The patient has normal back ROM.    Muscle Strength   The patient has normal back strength.    Tests   Straight leg raise right: negative  Straight leg raise left: negative    Reflexes   Patellar:  2/4 normal  Achilles:  2/4 normal    Other   Gait: normal     Comments:  - TODD b/l  - stork b/l  + Trendelenburg on L          Lumbar Spine X-Ray  Indication: Pain  Views: AP and Lateral    Findings:  No fracture  No bony lesion  Normal soft tissues  DDD L4-5, " L5-S1    No prior studies were available for comparison.        Diagnoses and all orders for this visit:    Chronic left-sided low back pain with left-sided sciatica  -     XR Spine Lumbar 2 or 3 View  -     predniSONE (DELTASONE) 20 MG tablet; Take 3 tabs daily for 3 d, then take 2 tabs daily for 3 d, then take 1 tab daily for 3 d, then take 0.5 tab daily for 3 d then stop      Discussed differential, exam and x-ray findings.  She has responded well to prednisone in past for her shoulder for unrelated condition.  She will be going to physical therapy later this week.  Demonstrated reversible bridges and also discussed planks to incorporate into her exercise routine.  Follow-up as needed.    EMR Dragon/Transcription disclaimer:    Much of this encounter note is an electronic transcription/translation of spoken language to printed text.  The electronic translation of spoken language may permit erroneous, or at times, nonsensical words or phrases to be inadvertently transcribed.  Although I have reviewed the note for such errors some may still exist.

## 2018-08-30 ENCOUNTER — TREATMENT (OUTPATIENT)
Dept: PHYSICAL THERAPY | Facility: CLINIC | Age: 63
End: 2018-08-30

## 2018-08-30 DIAGNOSIS — G89.29 CHRONIC LOW BACK PAIN, UNSPECIFIED BACK PAIN LATERALITY, WITH SCIATICA PRESENCE UNSPECIFIED: Primary | ICD-10-CM

## 2018-08-30 DIAGNOSIS — M54.5 CHRONIC LOW BACK PAIN, UNSPECIFIED BACK PAIN LATERALITY, WITH SCIATICA PRESENCE UNSPECIFIED: Primary | ICD-10-CM

## 2018-08-30 DIAGNOSIS — M25.60 LIMITED JOINT RANGE OF MOTION (ROM): ICD-10-CM

## 2018-08-30 PROCEDURE — 97161 PT EVAL LOW COMPLEX 20 MIN: CPT | Performed by: PHYSICAL THERAPIST

## 2018-08-30 PROCEDURE — 97014 ELECTRIC STIMULATION THERAPY: CPT | Performed by: PHYSICAL THERAPIST

## 2018-08-30 PROCEDURE — 97110 THERAPEUTIC EXERCISES: CPT | Performed by: PHYSICAL THERAPIST
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
## 2018-09-05 ENCOUNTER — OFFICE VISIT (OUTPATIENT)
Dept: PHYSICAL THERAPY | Facility: CLINIC | Age: 63
End: 2018-09-05

## 2018-09-05 DIAGNOSIS — M54.5 CHRONIC LOW BACK PAIN, UNSPECIFIED BACK PAIN LATERALITY, WITH SCIATICA PRESENCE UNSPECIFIED: Primary | ICD-10-CM

## 2018-09-05 DIAGNOSIS — G89.29 CHRONIC LOW BACK PAIN, UNSPECIFIED BACK PAIN LATERALITY, WITH SCIATICA PRESENCE UNSPECIFIED: Primary | ICD-10-CM

## 2018-09-05 PROCEDURE — 97110 THERAPEUTIC EXERCISES: CPT | Performed by: PHYSICAL THERAPIST

## 2018-09-05 PROCEDURE — 97530 THERAPEUTIC ACTIVITIES: CPT | Performed by: PHYSICAL THERAPIST

## 2018-09-05 PROCEDURE — 97014 ELECTRIC STIMULATION THERAPY: CPT | Performed by: PHYSICAL THERAPIST

## 2018-09-06 NOTE — PROGRESS NOTES
Physical Therapy Daily Progress Note    Time In  Time Out    Hanna Valle reports: estim was helpful to back.    Subjective     Objective   See Exercise, Manual, and Modality Logs for complete treatment.   Exercise rationale/ pain free exercise performance  Anatomy and structure of affected musculature  Posture/Postural awareness  Lumbar support  Sleeping positions with pillows  Alternate exercise positions    Added:sktc, ltr, piriformis stretch, bridges with ppt, abdominal hollowing with alt LE jc      Assessment/Plan  Good understanding, performance and technique of therapeutic exercises.  Benefits from stabilization activities to strengthen core musculature.  Relief reported with modality application.      Progress strengthening /stabilization /functional activity as symptoms allow.            Manual Therapy:         mins  42365;  Therapeutic Exercise:    20   mins  00563;     Neuromuscular Eliza:        mins  59788;    Therapeutic Activity:    20    mins  21654;     Gait Training:           mins  27725;     Ultrasound:        mins  98642;    Electrical Stimulation:    15     mins  26244 ( );      Timed Treatment:  55   mins   Total Treatment:     55   mins    Juancarlos Valdze, MORALES    Physical Therapist Assistant [place] [id number]

## 2018-09-12 ENCOUNTER — OFFICE VISIT (OUTPATIENT)
Dept: PHYSICAL THERAPY | Facility: CLINIC | Age: 63
End: 2018-09-12

## 2018-09-12 DIAGNOSIS — M54.5 CHRONIC LOW BACK PAIN, UNSPECIFIED BACK PAIN LATERALITY, WITH SCIATICA PRESENCE UNSPECIFIED: Primary | ICD-10-CM

## 2018-09-12 DIAGNOSIS — G89.29 CHRONIC LOW BACK PAIN, UNSPECIFIED BACK PAIN LATERALITY, WITH SCIATICA PRESENCE UNSPECIFIED: Primary | ICD-10-CM

## 2018-09-14 ENCOUNTER — OFFICE VISIT (OUTPATIENT)
Dept: FAMILY MEDICINE CLINIC | Facility: CLINIC | Age: 63
End: 2018-09-14

## 2018-09-14 VITALS
BODY MASS INDEX: 21.63 KG/M2 | TEMPERATURE: 98.2 F | WEIGHT: 129.8 LBS | HEART RATE: 61 BPM | HEIGHT: 65 IN | OXYGEN SATURATION: 97 % | SYSTOLIC BLOOD PRESSURE: 146 MMHG | DIASTOLIC BLOOD PRESSURE: 76 MMHG

## 2018-09-14 DIAGNOSIS — M25.541 ARTHRALGIA OF BOTH HANDS: Primary | ICD-10-CM

## 2018-09-14 DIAGNOSIS — M25.542 ARTHRALGIA OF BOTH HANDS: Primary | ICD-10-CM

## 2018-09-14 DIAGNOSIS — M79.671 PAIN IN BOTH FEET: ICD-10-CM

## 2018-09-14 DIAGNOSIS — M79.672 PAIN IN BOTH FEET: ICD-10-CM

## 2018-09-14 PROCEDURE — 99213 OFFICE O/P EST LOW 20 MIN: CPT | Performed by: FAMILY MEDICINE

## 2018-09-14 NOTE — PATIENT INSTRUCTIONS
This is a very nice 63-year-old who has bilateral foot pain and also hand numbness and pain.  I will request blood work and notify her when the results are available.

## 2018-09-14 NOTE — PROGRESS NOTES
Subjective   Hanna Valle is a 63 y.o. female presenting with   Chief Complaint   Patient presents with   • Foot Swelling     numbness and tingilling for couple month   • Hand Problem     numbness   • Headache        This is a 63-year-old white female nonsmoker who comes in today with the complaint that for several months she has pain in her feet and hands.  She says that oftentimes they feel stiff and swollen in the mornings when she wakes up.  She has chronic back pain and is currently involved in physical therapy, and at first thought that the foot problems were related to her back.  However with physical therapy her back is getting better but her feet continue to hurt.    On further questioning it turns out that a couple of years ago she saw a podiatrist who told her she had gout.  However she was not started on any kind of medication.  She tells me she eats salmon almost every day to try to help with her cholesterol.    She also tells me that she has a family history of rheumatoid arthritis.         The following portions of the patient's history were reviewed and updated as appropriate: current medications, past family history, past medical history, past social history, past surgical history and problem list.    Review of Systems   Musculoskeletal: Positive for arthralgias.   All other systems reviewed and are negative.      Objective   Physical Exam   Constitutional: She is oriented to person, place, and time. She appears well-developed and well-nourished.   HENT:   Head: Normocephalic and atraumatic.   Eyes: Pupils are equal, round, and reactive to light. EOM are normal.   Neck: Normal range of motion. Neck supple.   Musculoskeletal: Normal range of motion. 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 She exhibits no edema.   Neurological: She is alert and oriented to person, place, and time.   Skin: Skin is warm and dry.   Psychiatric: She has a normal mood and affect. Her behavior is normal.   Nursing note and vitals reviewed.      Assessment/Plan   Hanna MCCRARY was seen today for foot swelling, hand problem and headache.    Diagnoses and all orders for this visit:    Arthralgia of both hands  -     Comprehensive Metabolic Panel  -     Uric Acid  -     Sedimentation rate, automated  -     Rheumatoid Arthritis Expanded Panel    Pain in both feet  -     Comprehensive Metabolic Panel  -     Uric Acid  -     Sedimentation rate, automated  -     Rheumatoid Arthritis Expanded Panel                   I would like him to return for another visit in 6 month(s)

## 2018-09-17 LAB
ALBUMIN SERPL-MCNC: 4.1 G/DL (ref 3.6–4.8)
ALBUMIN/GLOB SERPL: 2.1 {RATIO} (ref 1.2–2.2)
ALP SERPL-CCNC: 44 IU/L (ref 39–117)
ALT SERPL-CCNC: 27 IU/L (ref 0–32)
AST SERPL-CCNC: 24 IU/L (ref 0–40)
BILIRUB SERPL-MCNC: 0.4 MG/DL (ref 0–1.2)
BUN SERPL-MCNC: 20 MG/DL (ref 8–27)
BUN/CREAT SERPL: 18 (ref 12–28)
CALCIUM SERPL-MCNC: 9 MG/DL (ref 8.7–10.3)
CCP IGA+IGG SERPL IA-ACNC: 6 UNITS (ref 0–19)
CHLORIDE SERPL-SCNC: 102 MMOL/L (ref 96–106)
CO2 SERPL-SCNC: 23 MMOL/L (ref 20–29)
CREAT SERPL-MCNC: 1.11 MG/DL (ref 0.57–1)
CRP SERPL-MCNC: <0.3 MG/L (ref 0–4.9)
ERYTHROCYTE [SEDIMENTATION RATE] IN BLOOD BY WESTERGREN METHOD: 2 MM/HR (ref 0–40)
GLOBULIN SER CALC-MCNC: 2 G/DL (ref 1.5–4.5)
GLUCOSE SERPL-MCNC: 92 MG/DL (ref 65–99)
POTASSIUM SERPL-SCNC: 4.3 MMOL/L (ref 3.5–5.2)
PROT SERPL-MCNC: 6.1 G/DL (ref 6–8.5)
RHEUMATOID FACT SERPL-ACNC: <10 IU/ML (ref 0–13.9)
SODIUM SERPL-SCNC: 142 MMOL/L (ref 134–144)
URATE SERPL-MCNC: 4.3 MG/DL (ref 2.5–7.1)

## 2018-09-18 ENCOUNTER — TREATMENT (OUTPATIENT)
Dept: PHYSICAL THERAPY | Facility: CLINIC | Age: 63
End: 2018-09-18

## 2018-09-18 DIAGNOSIS — M54.5 CHRONIC LOW BACK PAIN, UNSPECIFIED BACK PAIN LATERALITY, WITH SCIATICA PRESENCE UNSPECIFIED: Primary | ICD-10-CM

## 2018-09-18 DIAGNOSIS — M25.60 LIMITED JOINT RANGE OF MOTION (ROM): ICD-10-CM

## 2018-09-18 DIAGNOSIS — G89.29 CHRONIC LOW BACK PAIN, UNSPECIFIED BACK PAIN LATERALITY, WITH SCIATICA PRESENCE UNSPECIFIED: Primary | ICD-10-CM

## 2018-09-18 PROCEDURE — 97014 ELECTRIC STIMULATION THERAPY: CPT | Performed by: PHYSICAL THERAPIST

## 2018-09-18 PROCEDURE — 97140 MANUAL THERAPY 1/> REGIONS: CPT | Performed by: PHYSICAL THERAPIST

## 2018-09-18 NOTE — PROGRESS NOTES
Physical Therapy Daily Progress Note        Patient: Hanna Valle   : 1955  Diagnosis/ICD-10 Code:  Chronic low back pain, unspecified back pain laterality, with sciatica presence unspecified [M54.5, G89.29]  Referring practitioner: Carlitos De La Vega MD  Date of Initial Visit: Type: THERAPY  Noted: 2018  Today's Date: 2018  Patient seen for 4 sessions         Hanna Valle reports:    Subjective   Patient reports her back is a little better but she is having a fair amount of tightness in the right side today.    Objective   Hypertonic right PVM and QL  See Exercise, Manual, and Modality Logs for complete treatment.       Assessment/Plan  Patient tolerated treatment well with no adverse symptoms and improved tone and decreased discomfort post treatment.  Progress per Plan of Care           Manual Therapy:    8     mins  79274;  Therapeutic Exercise:         mins  23282;     Neuromuscular Eliza:        mins  23261;    Therapeutic Activity:          mins  51765;     Gait Training:           mins  96058;     Ultrasound:          mins  75634;    Electrical Stimulation:    15     mins  55769 ( );  Dry Needling          mins self-pay    Timed Treatment:   8   mins   Total Treatment:     35   mins    Britt Crow PT  Physical Therapist

## 2018-09-20 ENCOUNTER — TREATMENT (OUTPATIENT)
Dept: PHYSICAL THERAPY | Facility: CLINIC | Age: 63
End: 2018-09-20

## 2018-09-20 DIAGNOSIS — M54.5 CHRONIC LOW BACK PAIN, UNSPECIFIED BACK PAIN LATERALITY, WITH SCIATICA PRESENCE UNSPECIFIED: Primary | ICD-10-CM

## 2018-09-20 DIAGNOSIS — M25.60 LIMITED JOINT RANGE OF MOTION (ROM): ICD-10-CM

## 2018-09-20 DIAGNOSIS — M25.512 ACUTE PAIN OF LEFT SHOULDER: ICD-10-CM

## 2018-09-20 DIAGNOSIS — G89.29 CHRONIC LOW BACK PAIN, UNSPECIFIED BACK PAIN LATERALITY, WITH SCIATICA PRESENCE UNSPECIFIED: Primary | ICD-10-CM

## 2018-09-20 PROCEDURE — 97014 ELECTRIC STIMULATION THERAPY: CPT | Performed by: PHYSICAL THERAPIST

## 2018-09-20 PROCEDURE — 97140 MANUAL THERAPY 1/> REGIONS: CPT | Performed by: PHYSICAL THERAPIST

## 2018-09-20 NOTE — PROGRESS NOTES
Physical Therapy Daily Progress Note        Patient: Hanna Valle   : 1955  Diagnosis/ICD-10 Code:  Chronic low back pain, unspecified back pain laterality, with sciatica presence unspecified [M54.5, G89.29]  Referring practitioner: Carlitos De La Vega MD  Date of Initial Visit: Type: THERAPY  Noted: 2018  Today's Date: 2018  Patient seen for 5 sessions         Hanna Valle reports:      Subjective   Patient reports she is having a little less pain and feels the estim and the massage last session helped a lot.  Requests to have just the estim and massage as she is exercising daily.  States she is not having any difficulty with her exercises.    Objective   See Exercise, Manual, and Modality Logs for complete treatment.       Assessment/Plan  Patient tolerated treatment well with decreased right Lumbar muscle tightness and TTP.    Progress per Plan of Care           Manual Therapy:    15     mins  14290;  Therapeutic Exercise:         mins  89894;     Neuromuscular Eliza:        mins  87964;    Therapeutic Activity:          mins  80673;     Gait Training:           mins  36105;     Ultrasound:          mins  05145;    Electrical Stimulation:    15     mins  55655 ( );  Dry Needling          mins self-pay    Timed Treatment:   15   mins   Total Treatment:     35   mins    Britt Crow, PT  Physical Therapist

## 2018-12-19 ENCOUNTER — APPOINTMENT (OUTPATIENT)
Dept: WOMENS IMAGING | Facility: HOSPITAL | Age: 63
End: 2018-12-19

## 2018-12-19 PROCEDURE — 77067 SCR MAMMO BI INCL CAD: CPT | Performed by: RADIOLOGY

## 2018-12-19 PROCEDURE — 77063 BREAST TOMOSYNTHESIS BI: CPT | Performed by: RADIOLOGY

## 2019-02-06 RX ORDER — ZOLPIDEM TARTRATE 10 MG/1
TABLET ORAL
Qty: 30 TABLET | Refills: 1 | OUTPATIENT
Start: 2019-02-06

## 2019-04-17 ENCOUNTER — DOCUMENTATION (OUTPATIENT)
Dept: PHYSICAL THERAPY | Facility: CLINIC | Age: 64
End: 2019-04-17

## 2019-04-17 RX ORDER — LOVASTATIN 40 MG/1
TABLET ORAL
Qty: 30 TABLET | Refills: 0 | Status: SHIPPED | OUTPATIENT
Start: 2019-04-17

## 2019-09-25 RX ORDER — LISINOPRIL 10 MG/1
TABLET ORAL
Qty: 90 TABLET | Refills: 2 | OUTPATIENT
Start: 2019-09-25

## 2019-12-10 RX ORDER — LISINOPRIL 10 MG/1
TABLET ORAL
Qty: 90 TABLET | Refills: 2 | OUTPATIENT
Start: 2019-12-10

## 2021-01-04 RX ORDER — ZOLPIDEM TARTRATE 10 MG/1
10 TABLET ORAL NIGHTLY PRN
Qty: 30 TABLET | Refills: 1 | OUTPATIENT
Start: 2021-01-04

## 2021-01-04 NOTE — TELEPHONE ENCOUNTER
So unfortunately they have chagned the rules on this and we are not able to Rx controlled substances across state lines any longer unless I have a valid license in [place] as well which I do not, my best recommendatino would be to try an urgent care and explain the situation and see if they would be willing to give her a month until she gets in to see the other doctor

## 2021-01-04 NOTE — TELEPHONE ENCOUNTER
Pt called asking for refill on ambien; states has moved to [place] but can't get in to see them for a couple of weeks asked if we could send refill into [address] in [facility]    Last fill: 08/07/2020 Zolpidem Tartrate 10MG qty 30

## 2021-06-08 RX ORDER — ATORVASTATIN CALCIUM 40 MG/1
TABLET, FILM COATED ORAL
Qty: 90 TABLET | Refills: 2 | OUTPATIENT
Start: 2021-06-08

## 2021-06-08 RX ORDER — LISINOPRIL 10 MG/1
TABLET ORAL
Qty: 90 TABLET | Refills: 2 | OUTPATIENT
Start: 2021-06-08

## 2021-08-16 RX ORDER — CITALOPRAM 40 MG/1
TABLET ORAL
Qty: 90 TABLET | Refills: 2 | OUTPATIENT
Start: 2021-08-16

## 2021-08-16 NOTE — TELEPHONE ENCOUNTER
Rx Refill Note  Requested Prescriptions     Pending Prescriptions Disp Refills   • citalopram (CeleXA) 40 MG tablet [Pharmacy Med Name: CITALOPRAM HBR 40 MG TABLET] 90 tablet 2     Sig: TAKE 1 TAB DAILY FOR DEPRESSION      Last office visit with prescribing clinician: Visit date not found      Next office visit with prescribing clinician: Visit date not found            Elaine Jacobs MA  08/16/21, 08:11 EDT

## 2021-11-30 RX ORDER — ATORVASTATIN CALCIUM 40 MG/1
TABLET, FILM COATED ORAL
Qty: 90 TABLET | Refills: 2 | OUTPATIENT
Start: 2021-11-30
